# Patient Record
Sex: MALE | Race: WHITE | NOT HISPANIC OR LATINO | Employment: STUDENT | RURAL
[De-identification: names, ages, dates, MRNs, and addresses within clinical notes are randomized per-mention and may not be internally consistent; named-entity substitution may affect disease eponyms.]

---
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
## 2023-12-26 ENCOUNTER — OFFICE VISIT (OUTPATIENT)
Dept: FAMILY MEDICINE | Facility: CLINIC | Age: 18
End: 2023-12-26
Payer: COMMERCIAL

## 2023-12-26 VITALS
RESPIRATION RATE: 20 BRPM | WEIGHT: 189 LBS | OXYGEN SATURATION: 98 % | BODY MASS INDEX: 21.87 KG/M2 | HEART RATE: 101 BPM | SYSTOLIC BLOOD PRESSURE: 136 MMHG | HEIGHT: 78 IN | TEMPERATURE: 98 F | DIASTOLIC BLOOD PRESSURE: 71 MMHG

## 2023-12-26 DIAGNOSIS — J02.9 SORE THROAT: ICD-10-CM

## 2023-12-26 DIAGNOSIS — J10.1 INFLUENZA B: Primary | ICD-10-CM

## 2023-12-26 LAB
CTP QC/QA: YES
FLUAV AG NPH QL: NEGATIVE
FLUBV AG NPH QL: POSITIVE
S PYO RRNA THROAT QL PROBE: NEGATIVE
SARS-COV-2 RDRP RESP QL NAA+PROBE: NEGATIVE

## 2023-12-26 PROCEDURE — 99203 PR OFFICE/OUTPT VISIT, NEW, LEVL III, 30-44 MIN: ICD-10-PCS | Mod: ,,, | Performed by: NURSE PRACTITIONER

## 2023-12-26 PROCEDURE — 3075F PR MOST RECENT SYSTOLIC BLOOD PRESS GE 130-139MM HG: ICD-10-PCS | Mod: ,,, | Performed by: NURSE PRACTITIONER

## 2023-12-26 PROCEDURE — 3008F BODY MASS INDEX DOCD: CPT | Mod: ,,, | Performed by: NURSE PRACTITIONER

## 2023-12-26 PROCEDURE — 87635 SARS-COV-2 COVID-19 AMP PRB: CPT | Mod: ,,, | Performed by: NURSE PRACTITIONER

## 2023-12-26 PROCEDURE — 1159F MED LIST DOCD IN RCRD: CPT | Mod: ,,, | Performed by: NURSE PRACTITIONER

## 2023-12-26 PROCEDURE — 87804 POCT INFLUENZA A/B: ICD-10-PCS | Mod: 59,QW,, | Performed by: NURSE PRACTITIONER

## 2023-12-26 PROCEDURE — 3078F DIAST BP <80 MM HG: CPT | Mod: ,,, | Performed by: NURSE PRACTITIONER

## 2023-12-26 PROCEDURE — 1160F PR REVIEW ALL MEDS BY PRESCRIBER/CLIN PHARMACIST DOCUMENTED: ICD-10-PCS | Mod: ,,, | Performed by: NURSE PRACTITIONER

## 2023-12-26 PROCEDURE — 3008F PR BODY MASS INDEX (BMI) DOCUMENTED: ICD-10-PCS | Mod: ,,, | Performed by: NURSE PRACTITIONER

## 2023-12-26 PROCEDURE — 87880 STREP A ASSAY W/OPTIC: CPT | Mod: QW,,, | Performed by: NURSE PRACTITIONER

## 2023-12-26 PROCEDURE — 3075F SYST BP GE 130 - 139MM HG: CPT | Mod: ,,, | Performed by: NURSE PRACTITIONER

## 2023-12-26 PROCEDURE — 99203 OFFICE O/P NEW LOW 30 MIN: CPT | Mod: ,,, | Performed by: NURSE PRACTITIONER

## 2023-12-26 PROCEDURE — 87635: ICD-10-PCS | Mod: ,,, | Performed by: NURSE PRACTITIONER

## 2023-12-26 PROCEDURE — 1159F PR MEDICATION LIST DOCUMENTED IN MEDICAL RECORD: ICD-10-PCS | Mod: ,,, | Performed by: NURSE PRACTITIONER

## 2023-12-26 PROCEDURE — 1160F RVW MEDS BY RX/DR IN RCRD: CPT | Mod: ,,, | Performed by: NURSE PRACTITIONER

## 2023-12-26 PROCEDURE — 3078F PR MOST RECENT DIASTOLIC BLOOD PRESSURE < 80 MM HG: ICD-10-PCS | Mod: ,,, | Performed by: NURSE PRACTITIONER

## 2023-12-26 PROCEDURE — 87880 POCT RAPID STREP A: ICD-10-PCS | Mod: QW,,, | Performed by: NURSE PRACTITIONER

## 2023-12-26 PROCEDURE — 87804 INFLUENZA ASSAY W/OPTIC: CPT | Mod: 59,QW,, | Performed by: NURSE PRACTITIONER

## 2023-12-26 NOTE — PROGRESS NOTES
"[facility]       PATIENT NAME: Chuckie Funez   : 2005    AGE: 17 y.o. DATE OF ENCOUNTER: 23   MRN: [id number]      Visit type: WALK-IN  PCP:  No, Primary Doctor    Reason for Visit / Chief Complaint: Fever, Cough, Generalized Body Aches, Fatigue, Nausea, Nasal Congestion, Headache, and URI (Patient presents to the clinicv for uri)     Subjective:     Presents accompanied by his mother c/o started with a headache 6 days ago then felt okay until started feeling bad again 23.  No appetite, sore throat, nausea but no vomiting or diarrhea.    Review of Systems:     Review of Systems   Constitutional:  Positive for appetite change, chills and fatigue.   HENT:  Positive for congestion, rhinorrhea and sore throat.    Respiratory: Negative.     Cardiovascular: Negative.    Gastrointestinal:  Positive for nausea. Negative for abdominal pain, diarrhea and vomiting.   Musculoskeletal:  Positive for myalgias. Negative for neck stiffness.   Skin: Negative.    Neurological:  Positive for headaches.   Hematological: Negative.        Allergies and Meds:   Review of patient's allergies indicates:  No Known Allergies   No current outpatient medications on file.    Medical History:   History reviewed. No pertinent past medical history.   Social History     Tobacco Use   Smoking Status Never    Passive exposure: Never   Smokeless Tobacco Never      History reviewed. No pertinent surgical history.     There is no immunization history on file for this patient.     Objective:     Wt Readings from Last 3 Encounters:   23 0937 85.7 kg (189 lb) (86 %, Z= 1.10)*     * Growth percentiles are based on CDC (Boys, 2-20 Years) data.       /71 (BP Location: Right arm, Patient Position: Sitting, BP Method: Medium (Automatic))   Pulse 101   Temp 98.3 °F (36.8 °C) (Oral)   Resp 20   Ht 6' 10" (2.083 m)   Wt 85.7 kg (189 lb)   SpO2 98%   BMI 19.76 kg/m²   Body mass index is 19.76 kg/m². "     Physical Exam:    Physical Exam  Vitals and nursing note reviewed.   Constitutional:       General: He is not in acute distress.     Appearance: Normal appearance. He is ill-appearing. He is not toxic-appearing or diaphoretic.   HENT:      Head: Normocephalic.      Right Ear: Hearing, tympanic membrane, ear canal and external ear normal.      Left Ear: Hearing, tympanic membrane, ear canal and external ear normal.      Nose: Congestion and rhinorrhea present. Rhinorrhea is clear.      Right Sinus: No maxillary sinus tenderness or frontal sinus tenderness.      Left Sinus: No maxillary sinus tenderness or frontal sinus tenderness.      Mouth/Throat:      Lips: Pink.      Mouth: Mucous membranes are moist.      Tongue: No lesions.      Pharynx: Uvula midline. No pharyngeal swelling, oropharyngeal exudate, posterior oropharyngeal erythema or uvula swelling.      Comments: Pharynx injected  Eyes:      Conjunctiva/sclera: Conjunctivae normal.      Pupils: Pupils are equal, round, and reactive to light.   Cardiovascular:      Rate and Rhythm: Normal rate and regular rhythm.      Heart sounds: Normal heart sounds.   Pulmonary:      Effort: Pulmonary effort is normal. No respiratory distress.      Breath sounds: Normal breath sounds.   Musculoskeletal:      Cervical back: No rigidity.   Lymphadenopathy:      Cervical: No cervical adenopathy.   Skin:     General: Skin is warm and dry.      Coloration: Skin is not jaundiced or pale.      Findings: No rash.   Neurological:      General: No focal deficit present.      Mental Status: He is alert and oriented to person, place, and time.   Psychiatric:         Mood and Affect: Mood normal.         Behavior: Behavior normal.         Thought Content: Thought content normal.         Judgment: Judgment normal.         Assessment and Plan:        1. Influenza B    2. Sore throat  Comments:  Negative rapid strep, warm saltwater gargles.  Orders:  -     POCT COVID-19 Rapid  Screening  -     POCT Influenza A/B  -     POCT rapid strep A       Office Visit on 12/26/2023   Component Date Value Ref Range Status    POC Rapid COVID 12/26/2023 Negative  Negative Final     Acceptable 12/26/2023 Yes   Final    Rapid Influenza A Ag 12/26/2023 Negative  Negative Final    Rapid Influenza B Ag 12/26/2023 Positive (A)  Negative Final     Acceptable 12/26/2023 Yes   Final    Rapid Strep A Screen 12/26/2023 Negative  Negative, Positive Slide, Positive Tube Final     Acceptable 12/26/2023 Yes   Final      Too late to benefit from antiviral treatment due to has had symptoms > 48 hours.  Advised flu is a virus and will run its course.  Viruses are not treated by antibiotics nor will an antibiotic prevent a secondary bacterial infection from developing.  Rest, increase fluids, and continue supportive care.  F/u as needed or if symptoms worsen or persist.    Signature: Puja Lofton [facility]

## 2023-12-26 NOTE — LETTER
December 26, 2023      [facility]  [address] [place] [address]  Phone: [phone]  Fax: [phone]       Patient: Chuckie Funez   YOB: 2005  Date of Visit: 12/26/2023    To Whom It May Concern:    Porfirio Funez  was at [facility] on 12/26/2023. The patient may return to work/school on 12/29/23 with no restrictions. If you have any questions or concerns, or if I can be of further assistance, please do not hesitate to contact me.    Sincerely,    AJAY Valenzuela

## 2024-01-26 ENCOUNTER — ATHLETIC TRAINING SESSION (OUTPATIENT)
Dept: SPORTS MEDICINE | Facility: CLINIC | Age: 19
End: 2024-01-26
Payer: COMMERCIAL

## 2024-01-26 NOTE — PROGRESS NOTES
Mens  at [facility]. Came in the athletic training room for pre-game treatments. Received 15min of stim on both knees.

## 2024-01-30 ENCOUNTER — ATHLETIC TRAINING SESSION (OUTPATIENT)
Dept: SPORTS MEDICINE | Facility: CLINIC | Age: 19
End: 2024-01-30
Payer: COMMERCIAL

## 2024-01-30 NOTE — PROGRESS NOTES
Chuckie came to training room for treatment on his knee this morning at [facility]. He had stim on both knee's.  Then later that night he got hit in his nose during the basketball game, and I had to deal with his bloody nose and uniform. I got the blood out of his jersey and he returned to play.